# Patient Record
Sex: MALE | URBAN - METROPOLITAN AREA
[De-identification: names, ages, dates, MRNs, and addresses within clinical notes are randomized per-mention and may not be internally consistent; named-entity substitution may affect disease eponyms.]

---

## 2022-03-31 DIAGNOSIS — Z12.11 COLON CANCER SCREENING: Primary | ICD-10-CM

## 2022-04-29 ENCOUNTER — TELEPHONE (OUTPATIENT)
Dept: GASTROENTEROLOGY | Facility: CLINIC | Age: 57
End: 2022-04-29
Payer: COMMERCIAL

## 2022-04-29 NOTE — TELEPHONE ENCOUNTER
----- Message from Mariana Gomez sent at 4/27/2022  9:28 AM CDT -----  Regarding: Direct Schedule RM - Clinic calling to check status of appt  Beto kaur/ [facility] is calling to find out about appt ...her # is [phone].  Pt shows [employer] 2ndary but it is take charge.  Per Shayy we can schedule him.  Please call pt to schedule and let the clinic know appt.  Thanks